# Patient Record
(demographics unavailable — no encounter records)

---

## 2018-01-01 NOTE — RAD
SKULL FOUR VIEWS:

10/29/18

 

HISTORY: 

8-month-old male with history of scalp cyst. There is evidence for a focal area of soft tissue fullne
ss overlying the right side of the skull which approximates 3.7 cm in craniocaudal dimension. No evid
ence for overt underlying bony destructive changes. 

 

If there is concern for any acute intracranial abnormality, followup CT scan should be considered. 

 

IMPRESSION:  

Right sided scalp soft tissue fullness or mass without definite underlying outer table erosive or cinthya
tructive changes. If this is secondary to trauma, it could certainly represent a scalp hematoma, cons
ideration for a brain CT scan is suggested particularly if there is any clinical concern for intracra
nial injury which would certainly not been demonstrated on this plain film of the skull. 

 

POS: [facility]

## 2018-01-01 NOTE — RAD
PORTABLE SUPINE FRONTAL CHEST RADIOGRAPH:

 

DATE: 9/8/18.

 

COMPARISON: 

8/11/18.

 

HISTORY: 

Cough and congestion.

 

FINDINGS: 

Lungs appear hyperinflated.  Supine imaging limits assessment for pneumothorax and pleural fluid.  No
 focal consolidation.  Mild perihilar interstitial prominence and mild interstitial prominence in the
 right infrahilar region.

 

IMPRESSION: 

Pulmonary hyperinflation suggesting air trapping.  This may reflect sequelae of reactive airways dise
ase and/or interstitial pneumonitis.  No focal consolidation.

 

POS: [facility]

## 2018-01-01 NOTE — RAD
TWO VIEWS OF THE CHEST:

8/11/18

 

COMPARISON:  

None.

 

HISTORY: 

Cough

 

FINDINGS:  

Lungs are hyperinflated suggesting air trapping. No pneumothorax, pleural fluid, focal consolidation 
or alveolar edema. 

 

IMPRESSION:  

Pulmonary hyperinflation with no focal consolidation or alveolar edema.

 

 

 

POS: [facility]

## 2019-02-01 NOTE — RAD
2 VIEWS CHEST:

 

Date:  02/01/19 

 

HISTORY:  

Cough, congestion, and runny nose. Subjective fevers for 4 days. 

 

FINDINGS:

The heart and mediastinal structures are within normal limits. The lungs appear clear. Osseous struct
ures are intact. There is questionable mild subglottic edema. 

 

IMPRESSION: 

No acute process is seen involving the chest. There is questionable mild subglottic edema. 

 

 

POS: [facility]